# Patient Record
Sex: FEMALE | Race: WHITE | NOT HISPANIC OR LATINO | URBAN - METROPOLITAN AREA
[De-identification: names, ages, dates, MRNs, and addresses within clinical notes are randomized per-mention and may not be internally consistent; named-entity substitution may affect disease eponyms.]

---

## 2019-03-11 ENCOUNTER — OFFICE VISIT (OUTPATIENT)
Dept: URGENT CARE | Facility: URGENT CARE | Age: 54
End: 2019-03-11
Payer: COMMERCIAL

## 2019-03-11 VITALS
HEART RATE: 76 BPM | RESPIRATION RATE: 16 BRPM | OXYGEN SATURATION: 100 % | DIASTOLIC BLOOD PRESSURE: 82 MMHG | TEMPERATURE: 98.4 F | SYSTOLIC BLOOD PRESSURE: 138 MMHG

## 2019-03-11 DIAGNOSIS — M25.561 ACUTE PAIN OF RIGHT KNEE: Primary | ICD-10-CM

## 2019-03-11 PROCEDURE — 99203 OFFICE O/P NEW LOW 30 MIN: CPT | Performed by: PHYSICIAN ASSISTANT

## 2019-03-11 RX ORDER — IBUPROFEN 800 MG/1
800 TABLET, FILM COATED ORAL EVERY 8 HOURS PRN
Qty: 100 TABLET | Refills: 0 | Status: SHIPPED | OUTPATIENT
Start: 2019-03-11

## 2019-03-11 NOTE — PATIENT INSTRUCTIONS
(M25.873) Acute pain of right knee  (primary encounter diagnosis)  Comment:   Plan: order for DME, order for DME, ibuprofen         (ADVIL/MOTRIN) 800 MG tablet,         acetaminophen-codeine (TYLENOL #3) 300-30 MG         tablet, ORTHOPEDICS ADULT REFERRAL          Follow up with Orthopedics within 24-72 hours.      Ice to area over thin cloth    Walker     Rest as much as possible

## 2019-03-11 NOTE — PROGRESS NOTES
"Patient presents with:  Urgent Care: right knee injury from fall    SUBJECTIVE:  Chief Complaint   Patient presents with     Urgent Care     right knee injury from fall     Lori Manrique is a 53 year old female presents with a chief complaint of right knee pain since twisting her knee as she fell today.  Complains of pain, swelling and inability to bear weight on the affected extremity.      Denies any other trauma.      Has tried tylenol for the pain without relief.    SH:  \"stopped going to the doctor in 2011\"      No past medical history on file.   Denies any significant PMH    FH:  Mother Diabetes  Mother HTN  Mother Dementia  Father Dementia    There is no problem list on file for this patient.    Social History     Tobacco Use     Smoking status: Never Smoker     Smokeless tobacco: Never Used   Substance Use Topics     Alcohol use: Not on file       ROS:  CONSTITUTIONAL:NEGATIVE for fever, chills, change in weight  INTEGUMENTARY/SKIN: NEGATIVE for worrisome rashes, moles or lesions  RESP:NEGATIVE for significant cough or SOB  CV: NEGATIVE for chest pain, palpitations or peripheral edema  MUSCULOSKELETAL: as per HPI  NEURO: NEGATIVE for weakness, dizziness or paresthesias  ENDOCRINE: NEGATIVE for temperature intolerance, skin/hair changes  Review of systems negative except as stated above.    EXAM:   /82   Pulse 76   Temp 98.4  F (36.9  C) (Tympanic)   Resp 16   SpO2 100%   Gen: healthy, alert in mild distress secondary to pain  Extremity: right knee: tenderness at medial joint line with swelling.  Unable to test stability of joint secondary to pain.     There is not compromise to the distal circulation.  SKIN: no suspicious lesions or rashes or bruising  NEURO: Normal strength and tone, sensory exam grossly normal, mentation intact and speech normal    (M25.561) Acute pain of right knee  (primary encounter diagnosis)  Comment:   Plan: order for DME, order for DME, ibuprofen         (ADVIL/MOTRIN) 800 " MG tablet,         acetaminophen-codeine (TYLENOL #3) 300-30 MG         tablet, ORTHOPEDICS ADULT REFERRAL    Use codeine with caution          Follow up with Orthopedics within 24-72 hours.      Ice to area over thin cloth    Walker     Rest as much as possible     Patient expresses understanding and agreement with the assessment and plan as above.

## 2019-03-12 ENCOUNTER — TRANSFERRED RECORDS (OUTPATIENT)
Dept: HEALTH INFORMATION MANAGEMENT | Facility: CLINIC | Age: 54
End: 2019-03-12

## 2021-04-25 ENCOUNTER — HEALTH MAINTENANCE LETTER (OUTPATIENT)
Age: 56
End: 2021-04-25

## 2021-10-10 ENCOUNTER — HEALTH MAINTENANCE LETTER (OUTPATIENT)
Age: 56
End: 2021-10-10

## 2022-05-21 ENCOUNTER — HEALTH MAINTENANCE LETTER (OUTPATIENT)
Age: 57
End: 2022-05-21

## 2022-09-18 ENCOUNTER — HEALTH MAINTENANCE LETTER (OUTPATIENT)
Age: 57
End: 2022-09-18

## 2023-05-07 ENCOUNTER — HEALTH MAINTENANCE LETTER (OUTPATIENT)
Age: 58
End: 2023-05-07

## 2023-06-04 ENCOUNTER — HEALTH MAINTENANCE LETTER (OUTPATIENT)
Age: 58
End: 2023-06-04